# Patient Record
Sex: MALE | Race: WHITE | Employment: OTHER | URBAN - METROPOLITAN AREA
[De-identification: names, ages, dates, MRNs, and addresses within clinical notes are randomized per-mention and may not be internally consistent; named-entity substitution may affect disease eponyms.]

---

## 2024-01-01 ENCOUNTER — HOSPITAL ENCOUNTER (EMERGENCY)
Age: 84
Discharge: HOME | End: 2024-01-01
Attending: EMERGENCY MEDICINE
Payer: MEDICARE

## 2024-01-01 ENCOUNTER — APPOINTMENT (OUTPATIENT)
Dept: CT IMAGING | Age: 84
End: 2024-01-01
Payer: MEDICARE

## 2024-01-01 VITALS
OXYGEN SATURATION: 93 % | HEART RATE: 54 BPM | BODY MASS INDEX: 24.32 KG/M2 | SYSTOLIC BLOOD PRESSURE: 167 MMHG | RESPIRATION RATE: 22 BRPM | HEIGHT: 65 IN | DIASTOLIC BLOOD PRESSURE: 66 MMHG | TEMPERATURE: 97.5 F | WEIGHT: 146 LBS

## 2024-01-01 DIAGNOSIS — R42 DIZZINESS: Primary | ICD-10-CM

## 2024-01-01 LAB
ANION GAP SERPL CALCULATED.3IONS-SCNC: 12 MMOL/L (ref 9–17)
BASOPHILS # BLD: 0.08 K/UL (ref 0–0.2)
BASOPHILS NFR BLD: 1 % (ref 0–2)
BNP SERPL-MCNC: 949 PG/ML
BUN SERPL-MCNC: 20 MG/DL (ref 8–23)
CALCIUM SERPL-MCNC: 9 MG/DL (ref 8.6–10.4)
CHLORIDE SERPL-SCNC: 104 MMOL/L (ref 98–107)
CO2 SERPL-SCNC: 23 MMOL/L (ref 20–31)
CREAT SERPL-MCNC: 1 MG/DL (ref 0.7–1.2)
EOSINOPHIL # BLD: 0.37 K/UL (ref 0–0.44)
EOSINOPHILS RELATIVE PERCENT: 4 % (ref 1–4)
ERYTHROCYTE [DISTWIDTH] IN BLOOD BY AUTOMATED COUNT: 13 % (ref 11.8–14.4)
GFR SERPL CREATININE-BSD FRML MDRD: >60 ML/MIN/1.73M2
GLUCOSE SERPL-MCNC: 121 MG/DL (ref 70–99)
HCT VFR BLD AUTO: 41.5 % (ref 40.7–50.3)
HGB BLD-MCNC: 13.6 G/DL (ref 13–17)
IMM GRANULOCYTES # BLD AUTO: 0.04 K/UL (ref 0–0.3)
IMM GRANULOCYTES NFR BLD: 1 %
LYMPHOCYTES NFR BLD: 1.66 K/UL (ref 1.1–3.7)
LYMPHOCYTES RELATIVE PERCENT: 19 % (ref 24–43)
MCH RBC QN AUTO: 29.2 PG (ref 25.2–33.5)
MCHC RBC AUTO-ENTMCNC: 32.8 G/DL (ref 28.4–34.8)
MCV RBC AUTO: 89.2 FL (ref 82.6–102.9)
MONOCYTES NFR BLD: 0.91 K/UL (ref 0.1–1.2)
MONOCYTES NFR BLD: 11 % (ref 3–12)
NEUTROPHILS NFR BLD: 64 % (ref 36–65)
NEUTS SEG NFR BLD: 5.63 K/UL (ref 1.5–8.1)
NRBC BLD-RTO: 0 PER 100 WBC
PLATELET # BLD AUTO: 188 K/UL (ref 138–453)
PMV BLD AUTO: 10.5 FL (ref 8.1–13.5)
POTASSIUM SERPL-SCNC: 3.8 MMOL/L (ref 3.7–5.3)
RBC # BLD AUTO: 4.65 M/UL (ref 4.21–5.77)
SODIUM SERPL-SCNC: 139 MMOL/L (ref 135–144)
TROPONIN I SERPL HS-MCNC: 21 NG/L (ref 0–22)
TROPONIN I SERPL HS-MCNC: 23 NG/L (ref 0–22)
WBC OTHER # BLD: 8.7 K/UL (ref 3.5–11.3)

## 2024-01-01 PROCEDURE — 84484 ASSAY OF TROPONIN QUANT: CPT

## 2024-01-01 PROCEDURE — 99204 OFFICE O/P NEW MOD 45 MIN: CPT | Performed by: SURGERY

## 2024-01-01 PROCEDURE — 80048 BASIC METABOLIC PNL TOTAL CA: CPT

## 2024-01-01 PROCEDURE — 6360000004 HC RX CONTRAST MEDICATION

## 2024-01-01 PROCEDURE — 70450 CT HEAD/BRAIN W/O DYE: CPT

## 2024-01-01 PROCEDURE — 99285 EMERGENCY DEPT VISIT HI MDM: CPT | Performed by: EMERGENCY MEDICINE

## 2024-01-01 PROCEDURE — 70496 CT ANGIOGRAPHY HEAD: CPT

## 2024-01-01 PROCEDURE — 83880 ASSAY OF NATRIURETIC PEPTIDE: CPT

## 2024-01-01 PROCEDURE — 85025 COMPLETE CBC W/AUTO DIFF WBC: CPT

## 2024-01-01 RX ADMIN — IOPAMIDOL 90 ML: 755 INJECTION, SOLUTION INTRAVENOUS at 12:34

## 2024-01-01 ASSESSMENT — ENCOUNTER SYMPTOMS
SHORTNESS OF BREATH: 0
EYE PAIN: 0
WHEEZING: 1
COUGH: 0
VOICE CHANGE: 0
TROUBLE SWALLOWING: 0
COUGH: 1
COLOR CHANGE: 0
ABDOMINAL PAIN: 0
DIARRHEA: 0
SORE THROAT: 0
NAUSEA: 0
EYE DISCHARGE: 0
CHEST TIGHTNESS: 0
VOMITING: 0

## 2024-01-01 ASSESSMENT — PAIN SCALES - GENERAL: PAINLEVEL_OUTOF10: 5

## 2024-01-01 ASSESSMENT — PAIN DESCRIPTION - LOCATION: LOCATION: FOOT

## 2024-01-01 ASSESSMENT — PAIN DESCRIPTION - ORIENTATION: ORIENTATION: LEFT

## 2024-01-01 ASSESSMENT — PAIN - FUNCTIONAL ASSESSMENT: PAIN_FUNCTIONAL_ASSESSMENT: 0-10

## 2024-01-01 NOTE — ED NOTES
Pt to ED via triage with complaints of left foot pain, and dizziness. Pt was seen for the foot at [place], pt was diagnosed with PAD. Pt states he decided to come in to ensure this was  correct diagnosis and due to his in and off dizziness he has been having.   Pt has hx of lung cancer, and stent replacement. Pt is on stretcher, call light within reach, wife remains at bedside.

## 2024-01-01 NOTE — ED PROVIDER NOTES
[facility] ED     Emergency Department     Faculty Attestation        I performed a history and physical examination of the patient and discussed management with the resident. I reviewed the resident’s note and agree with the documented findings and plan of care. Any areas of disagreement are noted on the chart. I was personally present for the key portions of any procedures. I have documented in the chart those procedures where I was not present during the key portions. I have reviewed the emergency nurses triage note. I agree with the chief complaint, past medical history, past surgical history, allergies, medications, social and family history as documented unless otherwise noted below.    For mid-level providers such as nurse practitioners as well as physicians assistants:    I have personally seen and evaluated the patient.    I find the patient's history and physical exam are consistent with NP/PA documentation.  I agree with the care provided, treatment rendered, disposition, & follow-up plan.     Additional findings are as noted.    Vital Signs: BP (!) 164/72   Pulse 63   Temp 97.5 °F (36.4 °C) (Oral)   Resp 18   Ht 1.651 m (5' 5\")   Wt 66.2 kg (146 lb)   SpO2 94%   BMI 24.30 kg/m²   PCP:  Mak Dixon    Pertinent Comments:     Patient presents with intermittent headaches and feeling he is going to pass out.  Patient was seen in outlying facility and diagnosed with peripheral artery disease of the lower extremity starting Plavix.  He states his left lower extremity is significant improved.  He does have a history of aortic aneurysm repair with valve replacement.  He is awake alert and oriented with a GCS of 15 due to symptoms will obtain imaging laboratory studies, reassessment      Critical Care  Jareth Mckeon MD    Attending Emergency Medicine Physician            Casper Mckeon MD  01/01/24 [id number]    
COURSE / [facility]     Medical Decision Making  Ryan Kelly is a 83 y.o. male who presents with dizziness, fatigue, concerned about his left lower extremity.  Patient is GCS 15, nontoxic appearing, not in acute distress, speaking full sentences, able to ambulate under their own power.  Patient is afebrile, normotensive, nontachycardic, satting well on room air.  Examination reveals lungs are clear to auscultation bilaterally.  Abdomen soft nontender.  Peripheral pulses are 2+ throughout with the exception of the left lower extremity which has a 1+ DP pulse.  Patient has vascular surgery follow-up for this week in [facility].  Patient's daughter's primary concern is the fatigue and dizziness.  Patient currently is asymptomatic.  Given patient's age and medical history will obtain laboratory workup including CBC, BMP, EKG, troponin, BNP also obtain a CT head and CTA head and neck to rule out arterial disease in the carotids, abnormality with the Bovie and heart valve.    Amount and/or Complexity of Data Reviewed  Labs: ordered.  Radiology: ordered.  ECG/medicine tests: ordered.    Risk  Prescription drug management.        EKG  Normal sinus rhythm, ventricular rate of 63, left axis deviation, T wave inversions in V2, no ST elevations or depressions, nonspecific EKG    All EKG's are interpreted by the Emergency Department Physician who either signs or Co-signs this chart in the absence of a cardiologist.    EMERGENCY DEPARTMENT COURSE:           PROCEDURES:      CONSULTS:  IP CONSULT TO VASCULAR SURGERY    CRITICAL CARE:  There was significant risk of life threatening deterioration of patient's condition requiring my direct management. Critical care time  minutes, excluding any documented procedures.    FINAL IMPRESSION      1. Dizziness          DISPOSITION / PLAN     DISPOSITION Decision To Discharge 01/01/2024 04:51:23 PM      PATIENT REFERRED TO:  Leo Granados MD  [address] [address] [address]  [facility]

## 2024-01-01 NOTE — DISCHARGE INSTRUCTIONS
You were seen today in the emergency department for your concern about your foot and dizziness.  We now feel you are safe for discharge home.  The vascular surgeons ordered you carotid duplex and scans of your lower legs.  Please schedule to get these done soon as possible.  Then follow-up with the vascular surgeon who is numbers written below.    Please return to the emergency department immediately if develop any new or worsening concerns including chest pain, shortness of breath, abdominal pain, nausea, vomiting, diarrhea, weakness, loss consciousness, fever, chills, or any other concerns.    Please call your PCP and schedule appointment within the next 24 to 48 hours for follow-up.

## 2024-01-01 NOTE — CONSULTS
Division of Vascular Surgery        New Consult      Physician Requesting Consult:  Raffy    Reason for Consult:   Right carotid stenosis    Chief Complaint:     Headache and dizziness    History of Present Illness:      Ryan Kelly is a 83 y.o. gentleman with history of left carotid endarterectomy, stented repair of the aortic arch, and aortic valve replacement who presents with chief complaint of headache and dizziness, as well as recent history of cold and painful left lower extremity.    Patient reports that 3 days ago, he developed dizziness and a headache on the top of his head.  The symptoms have occurred twice in the past 3 days.  He denies that these occurred at the same time.  He also describes intermittent blurry vision like seeing heat waves.  He denies any loss of vision or monocular deficit.  He does report another episode of blurry vision 3 years ago and 1 episode of blacked out vision in the 80s.  He presents for yearly eye exams.  The last was 4 months ago without any abnormalities.  CTA head and neck was obtained during this visit which demonstrated 50% stenosis of the ICA on the right.    Patient also presented to [facility] on 12/30 for a cold and painful LLE.  He reports that he has had chronic pain issues with his left foot when walking long distances.  He experiences claudication symptoms after walking through the grocery store.  He states that he may have been experiencing this for years, but it has increased in severity in the past year.  He has also noticed slightly decreased sensation on the top of his left foot.  Doppler of left bilateral lower extremities demonstrated right PT NING of 1.04 and DP NING of 1.05.  Left side PT NING was 0.55 and DP was absent.  Duplex demonstrated left popliteal and AT occlusion    Patient takes a daily 81 mg aspirin.  He denies anticoagulation.    Additional past medical history includes COPD and squamous cell lung cancer for which he

## 2024-01-02 PROBLEM — R42 DIZZINESS: Status: ACTIVE | Noted: 2024-01-02

## 2024-01-05 LAB
EKG ATRIAL RATE: 63 BPM
EKG P AXIS: 47 DEGREES
EKG P-R INTERVAL: 196 MS
EKG Q-T INTERVAL: 456 MS
EKG QRS DURATION: 90 MS
EKG QTC CALCULATION (BAZETT): 466 MS
EKG R AXIS: -61 DEGREES
EKG T AXIS: 29 DEGREES
EKG VENTRICULAR RATE: 63 BPM

## 2024-01-08 DIAGNOSIS — I73.9 PERIPHERAL VASCULAR DISEASE, UNSPECIFIED (HCC): ICD-10-CM

## 2024-01-08 DIAGNOSIS — I65.23 BILATERAL CAROTID ARTERY STENOSIS: Primary | ICD-10-CM

## 2024-01-12 ENCOUNTER — HOSPITAL ENCOUNTER (OUTPATIENT)
Dept: VASCULAR LAB | Age: 84
End: 2024-01-12
Attending: SURGERY
Payer: MEDICARE

## 2024-01-12 DIAGNOSIS — I73.9 PERIPHERAL VASCULAR DISEASE, UNSPECIFIED (HCC): ICD-10-CM

## 2024-01-12 DIAGNOSIS — I65.23 BILATERAL CAROTID ARTERY STENOSIS: ICD-10-CM

## 2024-01-12 PROCEDURE — 93925 LOWER EXTREMITY STUDY: CPT

## 2024-01-12 PROCEDURE — 93880 EXTRACRANIAL BILAT STUDY: CPT

## 2024-01-12 PROCEDURE — 93923 UPR/LXTR ART STDY 3+ LVLS: CPT

## 2024-01-14 LAB
VAS LEFT ABI: 1.06
VAS LEFT ARM BP: 174 MMHG
VAS LEFT ATA PROX PSV: 42.7 CM/S
VAS LEFT BULB EDV: 13.6 CM/S
VAS LEFT BULB PSV: 82.7 CM/S
VAS LEFT CALF PRESSURE: 163 MMHG
VAS LEFT CCA DIST EDV: 21.5 CM/S
VAS LEFT CCA DIST PSV: 118.3 CM/S
VAS LEFT CCA MID EDV: 17.61 CM/S
VAS LEFT CCA MID PSV: 109.69 CM/S
VAS LEFT CCA PROX EDV: 17.6 CM/S
VAS LEFT CCA PROX PSV: 103.2 CM/S
VAS LEFT CFA PROX PSV: 81.3 CM/S
VAS LEFT CFA VEL RATIO: 0.68
VAS LEFT DORSALIS PEDIS BP: 165 MMHG
VAS LEFT ECA EDV: 0 CM/S
VAS LEFT ECA PSV: 175.2 CM/S
VAS LEFT EXT ILIAC DIST PSV: 118.7 CM/S
VAS LEFT ICA DIST EDV: 16 CM/S
VAS LEFT ICA DIST PSV: 79 CM/S
VAS LEFT ICA MID EDV: 16 CM/S
VAS LEFT ICA MID PSV: 88.7 CM/S
VAS LEFT ICA PROX EDV: 16 CM/S
VAS LEFT ICA PROX PSV: 87.1 CM/S
VAS LEFT ICA/CCA PSV: 0.81 NO UNITS
VAS LEFT LOW THIGH PRESSURE: 186 MMHG
VAS LEFT PERONEAL DIST PSV: 52.6 CM/S
VAS LEFT PERONEAL MID PSV: 63.6 CM/S
VAS LEFT PERONEAL PROX PSV: 161.6 CM/S
VAS LEFT PFA PROX PSV: 87.2 CM/S
VAS LEFT POP A DIST PSV: 63.7 CM/S
VAS LEFT POP A PROX PSV: 90.9 CM/S
VAS LEFT POP A PROX VEL RATIO: 0.77
VAS LEFT PTA BP: 184 MMHG
VAS LEFT PTA DIST PSV: 69.4 CM/S
VAS LEFT PTA MID PSV: 73.6 CM/S
VAS LEFT PTA PROX PSV: 67.1 CM/S
VAS LEFT SFA DIST PSV: 118.7 CM/S
VAS LEFT SFA DIST VEL RATIO: 1.22
VAS LEFT SFA MID PSV: 97 CM/S
VAS LEFT SFA MID VEL RATIO: 1.09
VAS LEFT SFA PROX PSV: 89.2 CM/S
VAS LEFT SFA PROX VEL RATIO: 1.1
VAS LEFT VERTEBRAL EDV: 14.19 CM/S
VAS LEFT VERTEBRAL PSV: 51.6 CM/S
VAS RIGHT ABI: 0.93
VAS RIGHT ARM BP: 168 MMHG
VAS RIGHT ATA PROX PSV: 27 CM/S
VAS RIGHT BULB EDV: 10.9 CM/S
VAS RIGHT BULB PSV: 57.1 CM/S
VAS RIGHT CALF PRESSURE: 153 MMHG
VAS RIGHT CCA DIST EDV: 15.4 CM/S
VAS RIGHT CCA DIST PSV: 75 CM/S
VAS RIGHT CCA MID EDV: 14.13 CM/S
VAS RIGHT CCA MID PSV: 78.93 CM/S
VAS RIGHT CCA PROX EDV: 14.1 CM/S
VAS RIGHT CCA PROX PSV: 69.9 CM/S
VAS RIGHT CFA PROX PSV: 132 CM/S
VAS RIGHT CFA VEL RATIO: 1.2
VAS RIGHT DORSALIS PEDIS BP: 162 MMHG
VAS RIGHT ECA EDV: 7.92 CM/S
VAS RIGHT ECA PSV: 104.9 CM/S
VAS RIGHT EXT ILIAC DIST PSV: 113.6 CM/S
VAS RIGHT ICA DIST EDV: 27.5 CM/S
VAS RIGHT ICA DIST PSV: 136.1 CM/S
VAS RIGHT ICA MID EDV: 21.5 CM/S
VAS RIGHT ICA MID PSV: 94.6 CM/S
VAS RIGHT ICA PROX EDV: 21.5 CM/S
VAS RIGHT ICA PROX PSV: 100.5 CM/S
VAS RIGHT ICA/CCA PSV: 1.7 NO UNITS
VAS RIGHT LOW THIGH PRESSURE: 207 MMHG
VAS RIGHT PERONEAL DIST PSV: 71.2 CM/S
VAS RIGHT PERONEAL MID PSV: 90.6 CM/S
VAS RIGHT PERONEAL PROX PSV: 23.5 CM/S
VAS RIGHT PFA PROX PSV: 140.1 CM/S
VAS RIGHT POP A DIST PSV: 62.8 CM/S
VAS RIGHT POP A PROX PSV: 53.3 CM/S
VAS RIGHT POP A PROX VEL RATIO: 0.34
VAS RIGHT PTA BP: 158 MMHG
VAS RIGHT PTA DIST PSV: 23 CM/S
VAS RIGHT PTA MID PSV: 33.1 CM/S
VAS RIGHT PTA PROX PSV: 23.5 CM/S
VAS RIGHT SFA DIST PSV: 155.6 CM/S
VAS RIGHT SFA DIST VEL RATIO: 1.74
VAS RIGHT SFA MID PSV: 89.3 CM/S
VAS RIGHT SFA MID VEL RATIO: 1
VAS RIGHT SFA PROX PSV: 91.5 CM/S
VAS RIGHT SFA PROX VEL RATIO: 0.7
VAS RIGHT VERTEBRAL EDV: 8.64 CM/S
VAS RIGHT VERTEBRAL PSV: 56.6 CM/S

## 2024-01-14 PROCEDURE — 93923 UPR/LXTR ART STDY 3+ LVLS: CPT | Performed by: STUDENT IN AN ORGANIZED HEALTH CARE EDUCATION/TRAINING PROGRAM

## 2024-01-14 PROCEDURE — 93925 LOWER EXTREMITY STUDY: CPT | Performed by: STUDENT IN AN ORGANIZED HEALTH CARE EDUCATION/TRAINING PROGRAM

## 2024-01-14 PROCEDURE — 93880 EXTRACRANIAL BILAT STUDY: CPT | Performed by: STUDENT IN AN ORGANIZED HEALTH CARE EDUCATION/TRAINING PROGRAM

## 2024-02-09 ENCOUNTER — OFFICE VISIT (OUTPATIENT)
Dept: VASCULAR SURGERY | Age: 84
End: 2024-02-09
Payer: MEDICARE

## 2024-02-09 VITALS
HEART RATE: 64 BPM | OXYGEN SATURATION: 94 % | SYSTOLIC BLOOD PRESSURE: 118 MMHG | HEIGHT: 65 IN | DIASTOLIC BLOOD PRESSURE: 60 MMHG | WEIGHT: 150 LBS | RESPIRATION RATE: 16 BRPM | BODY MASS INDEX: 24.99 KG/M2

## 2024-02-09 DIAGNOSIS — I65.23 BILATERAL CAROTID ARTERY STENOSIS: Primary | ICD-10-CM

## 2024-02-09 PROCEDURE — 1123F ACP DISCUSS/DSCN MKR DOCD: CPT | Performed by: SURGERY

## 2024-02-09 PROCEDURE — 99213 OFFICE O/P EST LOW 20 MIN: CPT | Performed by: SURGERY

## 2024-02-09 PROCEDURE — G8427 DOCREV CUR MEDS BY ELIG CLIN: HCPCS | Performed by: SURGERY

## 2024-02-09 PROCEDURE — G8484 FLU IMMUNIZE NO ADMIN: HCPCS | Performed by: SURGERY

## 2024-02-09 PROCEDURE — G8420 CALC BMI NORM PARAMETERS: HCPCS | Performed by: SURGERY

## 2024-02-09 PROCEDURE — 1036F TOBACCO NON-USER: CPT | Performed by: SURGERY

## 2024-02-09 NOTE — PROGRESS NOTES
[facility]  [address]  [place] [address]  Dept: [phone]     Patient: Ryan Kelly  : 1940  MRN: [id number]  DOS: 2024            HPI:  Ryan Kelly is a 83 y.o. male who returns to the office regarding a right carotid stenosis and bilateral lower extremity arterial disease.  The patient was seen in the hospital.  The carotid stenosis is approximately 50% on the right.  He has had carotid endarterectomy on the left.  He denies claudication.  He has bilateral foot pain which I think is not associated with arterial disease.  He never has rest pain.  He has foot pain when he walks which I think is orthopedic in nature.  He does not smoke cigarettes.  He uses aspirin 81 mg daily.  He is not on a statin medication that I can see.    Review of Systems    Vitals:    24 0946   BP: 118/60   Site: Left Upper Arm   Position: Sitting   Cuff Size: Medium Adult   Pulse: 64   Resp: 16   SpO2: 94%   Weight: 68 kg (150 lb)   Height: 1.651 m (5' 5\")          Physical Exam  On examination he has no carotid bruit on either side.  His chest is clear to auscultation bilaterally.  His heart has a regular rate and rhythm with no murmurs rubs or gallops.  His abdomen is soft nontender nondistended with normal bowel sounds and no masses.  I cannot detect an aneurysm by examination.  He has palpable femoral pulses and popliteal pulses bilaterally.  I cannot palpate dorsalis pedis or posterior tibial pulses in either lower extremity.  His feet are warm and well-perfused without ulceration or gangrene.  He has no dependent rubor.  He has hair growth on all of the toes.  He has no edema or varicose veins.  Assessment:  1. Bilateral carotid artery stenosis          Plan:  At this point I would do nothing further with the lower extremities.  His carotid ultrasound shows less than 50% stenosis but his CTA shows

## 2025-01-07 ENCOUNTER — TELEPHONE (OUTPATIENT)
Dept: VASCULAR SURGERY | Age: 85
End: 2025-01-07

## 2025-01-07 NOTE — TELEPHONE ENCOUNTER
Patient is doing very well and does not want to schedule a yearly f/u at this time, will call us when he wants to follow up.